# Patient Record
Sex: FEMALE | Race: WHITE | Employment: FULL TIME | URBAN - METROPOLITAN AREA
[De-identification: names, ages, dates, MRNs, and addresses within clinical notes are randomized per-mention and may not be internally consistent; named-entity substitution may affect disease eponyms.]

---

## 2021-03-16 VITALS — BODY MASS INDEX: 42.44 KG/M2 | HEIGHT: 68 IN | WEIGHT: 280 LBS

## 2021-03-16 ASSESSMENT — MIFFLIN-ST. JEOR: SCORE: 2008.57

## 2021-03-16 NOTE — PROGRESS NOTES
Emelina is a 35 year old who is being evaluated via a billable video visit.      How would you like to obtain your AVS? Mail a copy  If the video visit is dropped, the invitation should be resent by: Send to e-mail at: [email or web address]  Will anyone else be joining your video visit? No       Dipika Curtis, KALE on 3/16/2021 at 2:28 PM    Video Start Time: 10:00 AM  Video-Visit Details    Type of service:  Video Visit    Video End Time:10:26 AM    Originating Location (pt. Location): Home    Distant Location (provider location):  [facility]     Platform used for Video Visit: [facility]  Outpatient Sleep Medicine Consultation, New Patient      Name: Emelina Fernandez MRN# [id number]   Age: 35 year old YOB: 1985     Date of Consultation: March 17, 2021  Consultation is requested by: No referring provider defined for this encounter.  Primary care provider: No primary care provider on file.      Assessment and Plan:     1. Snoring  Patient is being evaluated for Obstructive Sleep Apnea (MALIK).  Risk factors for MALIK include:  snoring, excessive daytime sleepiness/subjective tiredness, obesity (STOP BANG score = 3).  Recommend HST since patient is at risk for MALIK without any relevant comorbid conditions.  She will follow up with me approximately two weeks after her sleep study has been completed to review the results and discuss plan of care.  I counseled the patient on the effects of sleep aid in the setting of untreated obstructive sleep apnea (suppressing arousals to breathe).  Additionally, I discussed the effects of alcohol use on sleep.    2. Hypersomnia  The patient was strongly advised to avoid driving, operating any heavy machinery or engaging in other hazardous situations while drowsy or sleepy.  Patient was counseled on the importance of driving while alert and to pull over if drowsy.     3. Morbid obesity  (H)  Patients with obesity are at higher risk for sleep apnea due to fat deposition in the posterior airway and tongue.  Sleep disruption associated with untreated sleep disorders (including, but not limited to, sleep apnea) can cause hormonal disruption that predisposes individuals to gain weight.  Weight loss can lead to improvement in sleep apnea severity and sleep quality.  Nocturnal hypoventilation may be a consideration in some individuals with obesity.    4. Gastroesophageal reflux disease without esophagitis  Gastroesophageal reflux (AWAIS) is commonly symptomatic during sleep and often occurs during brief arousals from sleep.  Sleep-related AWAIS is associated with longer acid contact time due to delayed esophageal acid clearance and decreased production of neutralizing saliva during sleep.  AWAIS can be associated with sleep fragmentation, insomnia, dyspnea, coughing/choking, retrosternal chest pain, or acid brash.  For those with untreated sleep apnea, negative inspiratory pressure can trigger reflux episodes.  PAP therapy can decrease frequency of nocturnal AWAIS.    History:     Emelina Fernandez is a 35 year old female whose visit was conducted via video today.  Patient reports snoring which has been present for a number of years.  She also describes sleep disruption with 2-5 awakenings per night due to symptoms of gastroesophageal reflux or nocturia.  Reduced sleep quality contributes to daytime somnolence but this is usually overcome with a strong personal drive and enthusiasm about her activities of the day.  She denies drowsy driving.  Her score on the Sycam[place] Sleepiness Scale is 5 out of 24.  She consumes 2-3 caffeinated beverages per day.  She reports improved sleep continuity with the use of blue light glasses and adjustments to her electronic screen brightness at night.  Sleep is a priority for her and she typically obtains 8 hours of sleep per night.  However, she may experience daytime sleepiness  "despite good sleep hygiene.  Her mother is diagnosed with sleep apnea.    Medical history is significant for morbid obesity with a BMI of 42.6 kg/m .  She is trying to lose weight.  Acid reflux is treated with omeprazole or baking soda with water..  Her bicarbonate level on BMP is in the normal range at 23 mmol/L.  She attributes her nocturia to drinking several liters of water per day.    Medications:     Current Outpatient Medications   Medication Sig     omeprazole 20 MG tablet Take 1 tablet (20 mg) by mouth daily Take 30-60 minutes before a meal. (Patient not taking: Reported on 3/16/2021)     UNKNOWN TO PATIENT Contarception pill     No current facility-administered medications for this visit.         No Known Allergies    Past Medical History:     No past medical history on file.     Past Surgical History:      No past surgical history on file.    Physical Examination:   Ht 1.727 m (5' 8\")   Wt 127 kg (280 lb)   BMI 42.57 kg/m             Data: All pertinent previous laboratory data reviewed     No results found for: PH, PHARTERIAL, PO2, [id number], SAT, PCO2, HCO3, BASEEXCESS, ANUPAMA, BEB  No results found for: TSH  Lab Results   Component Value Date    GLC 97 01/25/2014     Lab Results   Component Value Date    HGB 12.0 01/25/2014     Lab Results   Component Value Date    BUN 9 01/25/2014    CR 0.69 01/25/2014     Lab Results   Component Value Date    AST 28 01/25/2014    ALT 56 (H) 01/25/2014    ALKPHOS 50 01/25/2014    BILITOTAL 0.2 01/25/2014     No results found for: UAMP, UBARB, BENZODIAZEUR, UCANN, UCOC, OPIT, UPCP    Yahaira Tellez MD   3/17/2021   [facility] [address], [address]  [place] [address] [phone]    Copy to: No primary care provider on file.    "

## 2021-03-16 NOTE — PATIENT INSTRUCTIONS
Your BMI is Body mass index is 42.57 kg/m .  Weight management is a personal decision.  If you are interested in exploring weight loss strategies, the following discussion covers the approaches that may be successful. Body mass index (BMI) is one way to tell whether you are at a healthy weight, overweight, or obese. It measures your weight in relation to your height.  A BMI of 18.5 to 24.9 is in the healthy range. A person with a BMI of 25 to 29.9 is considered overweight, and someone with a BMI of 30 or greater is considered obese. More than two-thirds of American adults are considered overweight or obese.  Being overweight or obese increases the risk for further weight gain. Excess weight may lead to heart disease and diabetes.  Creating and following plans for healthy eating and physical activity may help you improve your health.  Weight control is part of healthy lifestyle and includes exercise, emotional health, and healthy eating habits. Careful eating habits lifelong are the mainstay of weight control. Though there are significant health benefits from weight loss, long-term weight loss with diet alone may be very difficult to achieve- studies show long-term success with dietary management in less than 10% of people. Attaining a healthy weight may be especially difficult to achieve in those with severe obesity. In some cases, medications, devices and surgical management might be considered.  What can you do?  If you are overweight or obese and are interested in methods for weight loss, you should discuss this with your provider.     Consider reducing daily calorie intake by 500 calories.     Keep a food journal.     Avoiding skipping meals, consider cutting portions instead.    Diet combined with exercise helps maintain muscle while optimizing fat loss. Strength training is particularly important for building and maintaining muscle mass. Exercise helps reduce stress, increase energy, and improves fitness.  Increasing exercise without diet control, however, may not burn enough calories to loose weight.       Start walking three days a week 10-20 minutes at a time    Work towards walking thirty minutes five days a week     Eventually, increase the speed of your walking for 1-2 minutes at time    In addition, we recommend that you review healthy lifestyles and methods for weight loss available through the National Institutes of Health patient information sites:  http://win.niddk.nih.gov/publications/index.htm    And look into health and wellness programs that may be available through your health insurance provider, employer, local community center, or cindy club.    Weight management plan: Patient was referred to their PCP to discuss a diet and exercise plan.

## 2021-03-17 ENCOUNTER — VIRTUAL VISIT (OUTPATIENT)
Dept: SLEEP MEDICINE | Facility: CLINIC | Age: 36
End: 2021-03-17
Payer: COMMERCIAL

## 2021-03-17 DIAGNOSIS — R06.83 SNORING: Primary | ICD-10-CM

## 2021-03-17 DIAGNOSIS — G47.10 HYPERSOMNIA: ICD-10-CM

## 2021-03-17 DIAGNOSIS — K21.9 GASTROESOPHAGEAL REFLUX DISEASE WITHOUT ESOPHAGITIS: ICD-10-CM

## 2021-03-17 DIAGNOSIS — E66.01 MORBID OBESITY (H): ICD-10-CM

## 2021-03-17 PROCEDURE — 99204 OFFICE O/P NEW MOD 45 MIN: CPT | Mod: GT | Performed by: PEDIATRICS

## 2021-04-05 ENCOUNTER — MYC MEDICAL ADVICE (OUTPATIENT)
Dept: SLEEP MEDICINE | Facility: CLINIC | Age: 36
End: 2021-04-05

## 2021-04-22 NOTE — TELEPHONE ENCOUNTER
Called patient and scheduled HST  and drop off at [facility].  Sent her HST information via [employer].

## 2021-04-24 ENCOUNTER — HEALTH MAINTENANCE LETTER (OUTPATIENT)
Age: 36
End: 2021-04-24

## 2021-10-03 ENCOUNTER — HEALTH MAINTENANCE LETTER (OUTPATIENT)
Age: 36
End: 2021-10-03

## 2022-05-15 ENCOUNTER — HEALTH MAINTENANCE LETTER (OUTPATIENT)
Age: 37
End: 2022-05-15

## 2022-09-10 ENCOUNTER — HEALTH MAINTENANCE LETTER (OUTPATIENT)
Age: 37
End: 2022-09-10

## 2023-06-03 ENCOUNTER — HEALTH MAINTENANCE LETTER (OUTPATIENT)
Age: 38
End: 2023-06-03